# Patient Record
Sex: MALE | Race: WHITE | URBAN - NONMETROPOLITAN AREA
[De-identification: names, ages, dates, MRNs, and addresses within clinical notes are randomized per-mention and may not be internally consistent; named-entity substitution may affect disease eponyms.]

---

## 2021-09-27 ENCOUNTER — OFFICE VISIT (OUTPATIENT)
Dept: URBAN - NONMETROPOLITAN AREA CLINIC 2 | Facility: CLINIC | Age: 76
End: 2021-09-27
Payer: MEDICARE

## 2021-09-27 ENCOUNTER — WEB ENCOUNTER (OUTPATIENT)
Dept: URBAN - NONMETROPOLITAN AREA CLINIC 2 | Facility: CLINIC | Age: 76
End: 2021-09-27

## 2021-09-27 ENCOUNTER — LAB OUTSIDE AN ENCOUNTER (OUTPATIENT)
Dept: URBAN - NONMETROPOLITAN AREA CLINIC 2 | Facility: CLINIC | Age: 76
End: 2021-09-27

## 2021-09-27 DIAGNOSIS — Z86.010 PERSONAL HISTORY OF COLONIC POLYPS: ICD-10-CM

## 2021-09-27 DIAGNOSIS — R10.31 ABDOMINAL CRAMPING IN RIGHT LOWER QUADRANT: ICD-10-CM

## 2021-09-27 DIAGNOSIS — Z12.11 COLON CANCER SCREENING: ICD-10-CM

## 2021-09-27 DIAGNOSIS — Z87.19 HISTORY OF DIVERTICULITIS: ICD-10-CM

## 2021-09-27 PROCEDURE — 99204 OFFICE O/P NEW MOD 45 MIN: CPT | Performed by: NURSE PRACTITIONER

## 2021-09-27 RX ORDER — ASPIRIN 81 MG/1
1 TABLET TABLET, COATED ORAL ONCE A DAY
Status: ACTIVE | COMMUNITY

## 2021-09-27 RX ORDER — DICYCLOMINE HYDROCHLORIDE 10 MG/1
1 CAPSULE BEFORE MEALS PRN ABDOMINAL PAIN CAPSULE ORAL ONCE DAILY
Qty: 30 CAPSULES | Refills: 1 | OUTPATIENT
Start: 2021-09-27 | End: 2021-11-25

## 2021-09-27 NOTE — HPI-TODAY'S VISIT:
9/27/2021 Mr. Tracy Quick is a very pleasant 75-year-old male with history of colon polyps and diverticular abscess in 2020 who presents for first visit today to schedule colonoscopy. Admitted 12/2020 for abdominal pain and diarrhea with CT revealing diverticular abscess measuring 4 cm x 2.7 cm in the superior margin of the mid sigmoid colon requiring IR drain placement and IV antibiotics.  He completed treatment regimen and has not had a recurring episode of diverticulitis.  He does need a colonoscopy.  His last colonoscopy was done in 2017 by Dr. Kendrick Molina and noted to have diverticulosis and 1 polyp that was removed. Overall, he is doing well.  He does occasionally have some crampy lower abdominal pain that resolves with Kaopectate.  For the most part he can avoid this if he avoids spicy seasoning and small seeds.  He does not have fever or chills with this and his pain typically resolves within 1 to 2 hours.  He is having normal bowel movements.  Denies rectal bleeding.  No anemia.  No upper GI symptoms.  Overall, he feels well.  TG

## 2021-11-10 ENCOUNTER — CLAIMS CREATED FROM THE CLAIM WINDOW (OUTPATIENT)
Dept: URBAN - METROPOLITAN AREA CLINIC 4 | Facility: CLINIC | Age: 76
End: 2021-11-10
Payer: MEDICARE

## 2021-11-10 ENCOUNTER — OFFICE VISIT (OUTPATIENT)
Dept: URBAN - NONMETROPOLITAN AREA SURGERY CENTER 1 | Facility: SURGERY CENTER | Age: 76
End: 2021-11-10
Payer: MEDICARE

## 2021-11-10 DIAGNOSIS — Z86.010 ADENOMAS PERSONAL HISTORY OF COLONIC POLYPS: ICD-10-CM

## 2021-11-10 DIAGNOSIS — K63.89 POLYP, HYPERPLASTIC: ICD-10-CM

## 2021-11-10 DIAGNOSIS — K63.5 BENIGN COLON POLYP: ICD-10-CM

## 2021-11-10 PROCEDURE — 88305 TISSUE EXAM BY PATHOLOGIST: CPT | Performed by: PATHOLOGY

## 2021-11-10 PROCEDURE — G8907 PT DOC NO EVENTS ON DISCHARG: HCPCS | Performed by: INTERNAL MEDICINE

## 2021-11-10 PROCEDURE — 45385 COLONOSCOPY W/LESION REMOVAL: CPT | Performed by: INTERNAL MEDICINE

## 2021-11-30 ENCOUNTER — DASHBOARD ENCOUNTERS (OUTPATIENT)
Age: 76
End: 2021-11-30

## 2021-11-30 ENCOUNTER — OFFICE VISIT (OUTPATIENT)
Dept: URBAN - NONMETROPOLITAN AREA CLINIC 2 | Facility: CLINIC | Age: 76
End: 2021-11-30
Payer: MEDICARE

## 2021-11-30 DIAGNOSIS — Z86.010 PERSONAL HISTORY OF COLONIC POLYPS: ICD-10-CM

## 2021-11-30 DIAGNOSIS — R10.30 LOWER ABDOMINAL PAIN: ICD-10-CM

## 2021-11-30 DIAGNOSIS — Z87.19 HISTORY OF DIVERTICULITIS: ICD-10-CM

## 2021-11-30 PROBLEM — 118361000119100: Status: ACTIVE | Noted: 2021-11-30

## 2021-11-30 PROBLEM — 428283002: Status: ACTIVE | Noted: 2021-09-27

## 2021-11-30 PROCEDURE — 99213 OFFICE O/P EST LOW 20 MIN: CPT | Performed by: NURSE PRACTITIONER

## 2021-11-30 RX ORDER — DICYCLOMINE HYDROCHLORIDE 10 MG/1
1 CAPSULE BEFORE MEALS PRN ABDOMINAL PAIN CAPSULE ORAL THREE TIMES A DAY
Qty: 270 TABLET | Refills: 3
Start: 2021-09-27 | End: 2022-11-25

## 2021-11-30 RX ORDER — ASPIRIN 81 MG/1
1 TABLET TABLET, COATED ORAL ONCE A DAY
Status: ACTIVE | COMMUNITY

## 2021-11-30 NOTE — HPI-TODAY'S VISIT:
9/27/2021 Mr. Tracy Quick is a very pleasant 75-year-old male with history of colon polyps and diverticular abscess in 2020 who presents for first visit today to schedule colonoscopy. Admitted 12/2020 for abdominal pain and diarrhea with CT revealing diverticular abscess measuring 4 cm x 2.7 cm in the superior margin of the mid sigmoid colon requiring IR drain placement and IV antibiotics.  He completed treatment regimen and has not had a recurring episode of diverticulitis.  He does need a colonoscopy.  His last colonoscopy was done in 2017 by Dr. Kendrick Molina and noted to have diverticulosis and 1 polyp that was removed. Overall, he is doing well.  He does occasionally have some crampy lower abdominal pain that resolves with Kaopectate.  For the most part he can avoid this if he avoids spicy seasoning and small seeds.  He does not have fever or chills with this and his pain typically resolves within 1 to 2 hours.  He is having normal bowel movements.  Denies rectal bleeding.  No anemia.  No upper GI symptoms.  Overall, he feels well.  TG  11/30/2021 Mr Quick presents for follow up after colonoscopy.  Colonoscopy 11/10/2021: 1 small hyperplastic polyp removed from sigmoid colon; sigmoid diverticulosis noted, o/w normal exam to the TI. Dr. Sierra recommended repeat colonoscopy in 5 years for personal history of colon polyps. His abdominal pain has almost resolved since avoiding food triggers including sawmill gravy, tomato products, seeds, and nuts. He rarely needs dicyclomine but would like a refill today as he and his wife are planning to [place] a trip [place] in the early [place]. TG